# Patient Record
Sex: MALE | URBAN - METROPOLITAN AREA
[De-identification: names, ages, dates, MRNs, and addresses within clinical notes are randomized per-mention and may not be internally consistent; named-entity substitution may affect disease eponyms.]

---

## 2017-05-30 ENCOUNTER — TRANSFERRED RECORDS (OUTPATIENT)
Dept: HEALTH INFORMATION MANAGEMENT | Facility: CLINIC | Age: 22
End: 2017-05-30

## 2017-06-06 ENCOUNTER — TRANSFERRED RECORDS (OUTPATIENT)
Dept: HEALTH INFORMATION MANAGEMENT | Facility: CLINIC | Age: 22
End: 2017-06-06

## 2017-06-22 ENCOUNTER — OFFICE VISIT (OUTPATIENT)
Dept: OTHER | Facility: OUTPATIENT CENTER | Age: 22
End: 2017-06-22

## 2017-06-22 DIAGNOSIS — F91.8 CONDUCT DISORDER, UNDIFFERENTIATED TYPE: Primary | ICD-10-CM

## 2017-06-22 NOTE — MR AVS SNAPSHOT
After Visit Summary   2017    Fran Strong    MRN: [id number]           Patient Information     Date Of Birth          1995        Visit Information        Provider Department      2017 1:00 PM Med Alfonso, PhD LP [facility]        Today's Diagnoses     Conduct disorder, undifferentiated type    -  1       Follow-ups after your visit        Your next 10 appointments already scheduled     2017  9:00 AM CDT   INDIVIDUAL THERAPY with Med Alfonso, PhD DOMO   [facility] ([facility])    [address] [facility] [address]  Mail Code 7521  [facility] [address]   [phone]              Who to contact     Please call your clinic at [phone] to:    Ask questions about your health    Make or cancel appointments    Discuss your medicines    Learn about your test results    Speak to your doctor   If you have compliments or concerns about an experience at your clinic, or if you wish to file a complaint, please contact [facility] Patient Relations at [phone] or email us at [email or web address]         Additional Information About Your Visit        MyChart Information     [employer] is an electronic gateway that provides easy, online access to your medical records. With [employer], you can request a clinic appointment, read your test results, renew a prescription or communicate with your care team.     To sign up for [employer] visit the website at [email or web address]   You will be asked to enter the access code listed below, as well as some personal information. Please follow the directions to create your username and password.     Your access code is: [email or web address]  Expires: 2017  3:26 PM     Your access code will  in 90 days. If you need help or a new code, please contact your [facility] or call [phone] for assistance.        Care EveryWhere ID     This is your  Care EveryWhere ID. This could be used by other organizations to access your [place] medical records  [id number]         Blood Pressure from Last 3 Encounters:   No data found for BP    Weight from Last 3 Encounters:   No data found for Wt              We Performed the Following     Diagnostic Assessment (complete) [88274]        Primary Care Provider    None Specified       No primary provider on file.        Equal Access to Services     [facility] : Hadii aad ku hadasho Soomaali, waaxda luqadaha, qaybta kaalmada adeharshadyada, mart paulino guyn augustusharshad lyonjasper rick . So [facility] [phone].    ATENCIÓN: Si habla español, tiene a rogers disposición servicios gratuitos de asistencia lingüística. Llame al [phone].    We comply with applicable federal civil rights laws and [place] laws. We do not discriminate on the basis of race, color, national origin, age, disability sex, sexual orientation or gender identity.            Thank you!     Thank you for choosing [place] [facility]  for your care. Our goal is always to provide you with excellent care. Hearing back from our patients is one way we can continue to improve our services. Please take a few minutes to complete the written survey that you may receive in the mail after your visit with us. Thank you!             Your Updated Medication List - Protect others around you: Learn how to safely use, store and throw away your medicines at www.disposemymeds.org.          This list is accurate as of: 6/22/17 11:59 PM.  Always use your most recent med list.                   Brand Name Dispense Instructions for use Diagnosis    fluvoxaMINE 100 MG tablet    LUVOX    270 tablet    Take 1 1/2 tablets BID    Impulse control disorder, unspecified       memantine 10 MG tablet    NAMENDA    180 tablet    Take 1 tablet by mouth daily.    Impulse control disorder, unspecified       * methylphenidate 10 MG tablet    RITALIN    30 tablet    Take 1 tablet by mouth daily.     ADHD, predominantly inattentive type       * methylphenidate ER 54 MG CR tablet    CONCERTA    60 tablet    Take 1 tablet by mouth 2 times daily.    ADHD, predominantly inattentive type       * methylphenidate ER 54 MG CR tablet    CONCERTA    60 tablet    Take 1 tablet by mouth 2 times daily.    ADHD, predominantly inattentive type       * methylphenidate ER 54 MG CR tablet    CONCERTA    60 tablet    Take 1 tablet by mouth 2 times daily.    ADHD, predominantly inattentive type       risperiDONE 0.5 MG tablet    risperDAL    315 tablet    take 1 1/2 tablets QAM and 2 tablets QPM    Impulse control disorder, unspecified       * Notice:  This list has 4 medication(s) that are the same as other medications prescribed for you. Read the directions carefully, and ask your doctor or other care provider to review them with you.

## 2017-06-23 ASSESSMENT — ANXIETY QUESTIONNAIRES
6. BECOMING EASILY ANNOYED OR IRRITABLE: SEVERAL DAYS
2. NOT BEING ABLE TO STOP OR CONTROL WORRYING: NOT AT ALL
3. WORRYING TOO MUCH ABOUT DIFFERENT THINGS: NOT AT ALL
GAD7 TOTAL SCORE: 1
7. FEELING AFRAID AS IF SOMETHING AWFUL MIGHT HAPPEN: NOT AT ALL
5. BEING SO RESTLESS THAT IT IS HARD TO SIT STILL: NOT AT ALL
4. TROUBLE RELAXING: NOT AT ALL
1. FEELING NERVOUS, ANXIOUS, OR ON EDGE: NOT AT ALL
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
## 2017-06-24 ASSESSMENT — PATIENT HEALTH QUESTIONNAIRE - PHQ9: SUM OF ALL RESPONSES TO PHQ QUESTIONS 1-9: 0

## 2017-06-24 ASSESSMENT — ANXIETY QUESTIONNAIRES: GAD7 TOTAL SCORE: 1

## 2017-09-12 ENCOUNTER — OFFICE VISIT (OUTPATIENT)
Dept: OTHER | Facility: OUTPATIENT CENTER | Age: 22
End: 2017-09-12

## 2017-09-12 DIAGNOSIS — F91.8 CONDUCT DISORDER, UNDIFFERENTIATED TYPE: Primary | ICD-10-CM

## 2017-09-12 NOTE — MR AVS SNAPSHOT
After Visit Summary   9/12/2017    Fran Strong    MRN: [id number]           Patient Information     Date Of Birth          1995        Visit Information        Provider Department      9/12/2017 3:00 PM Med Alfonso, PhD  [facility]        Today's Diagnoses     Conduct disorder, undifferentiated type    -  1       Follow-ups after your visit        Your next 10 appointments already scheduled     Nov 01, 2017  3:00 PM CDT   INDIVIDUAL THERAPY with Elis Byrd PhD   [facility] ([facility])    [address]  [address]  [facility] [address]   [phone]            Nov 29, 2017  3:00 PM CST   INDIVIDUAL THERAPY with Elis Byrd PhD   [facility] ([facility])    [address]  [address]  [facility] [address]   [phone]              Who to contact     Please call your clinic at [phone] to:    Ask questions about your health    Make or cancel appointments    Discuss your medicines    Learn about your test results    Speak to your doctor   If you have compliments or concerns about an experience at your clinic, or if you wish to file a complaint, please contact [facility] Patient Relations at [phone] or email us at [email or web address]         Additional Information About Your Visit        MyChart Information     [employer] is an electronic gateway that provides easy, online access to your medical records. With [employer], you can request a clinic appointment, read your test results, renew a prescription or communicate with your care team.     To sign up for [employer] visit the website at [email or web address]   You will be asked to enter the access code listed below, as well as some personal information. Please follow the directions to create your username and password.     Your access code is: [email or web address]  Expires: 9/24/2017  3:26 PM      Your access code will  in 90 days. If you need help or a new code, please contact your [facility] or call [phone] for assistance.        Care EveryWhere ID     This is your Care EveryWhere ID. This could be used by other organizations to access your [facility] medical records  [id number]         Blood Pressure from Last 3 Encounters:   No data found for BP    Weight from Last 3 Encounters:   No data found for Wt              We Performed the Following     Individual Psychotherapy (16-37 min) [26413]        Primary Care Provider    None Specified       No primary provider on file.        Equal Access to Services     [facility]: Hadii aad trudy hadasho Soomaali, waaxda luqadaha, qaybta kaalmada ivonne, mart rick . So [facility] [phone].    ATENCIÓN: Si habla español, tiene a rogers disposición servicios gratuitos de asistencia lingüística. [facility] [phone].    We comply with applicable federal civil rights laws and [place] laws. We do not discriminate on the basis of race, color, national origin, age, disability sex, sexual orientation or gender identity.            Thank you!     Thank you for choosing [facility]  for your care. Our goal is always to provide you with excellent care. Hearing back from our patients is one way we can continue to improve our services. Please take a few minutes to complete the written survey that you may receive in the mail after your visit with us. Thank you!             Your Updated Medication List - Protect others around you: Learn how to safely use, store and throw away your medicines at www.disposemymeds.org.          This list is accurate as of: 17  3:36 PM.  Always use your most recent med list.                   Brand Name Dispense Instructions for use Diagnosis    fluvoxaMINE 100 MG tablet    LUVOX    270 tablet    Take 1 1/2 tablets BID    Impulse control disorder, unspecified        memantine 10 MG tablet    NAMENDA    180 tablet    Take 1 tablet by mouth daily.    Impulse control disorder, unspecified       * methylphenidate 10 MG tablet    RITALIN    30 tablet    Take 1 tablet by mouth daily.    ADHD, predominantly inattentive type       * methylphenidate ER 54 MG CR tablet    CONCERTA    60 tablet    Take 1 tablet by mouth 2 times daily.    ADHD, predominantly inattentive type       * methylphenidate ER 54 MG CR tablet    CONCERTA    60 tablet    Take 1 tablet by mouth 2 times daily.    ADHD, predominantly inattentive type       * methylphenidate ER 54 MG CR tablet    CONCERTA    60 tablet    Take 1 tablet by mouth 2 times daily.    ADHD, predominantly inattentive type       risperiDONE 0.5 MG tablet    risperDAL    315 tablet    take 1 1/2 tablets QAM and 2 tablets QPM    Impulse control disorder, unspecified       * Notice:  This list has 4 medication(s) that are the same as other medications prescribed for you. Read the directions carefully, and ask your doctor or other care provider to review them with you.

## 2017-09-12 NOTE — PROGRESS NOTES
[facility] -  Case Progress Note    Date of Service: 9/12/17  Client Name: Fran Strong  YOB: 1995  MRN:  [id number]  Treating Provider: Med Alfonso, PhD LP  Type of Session: Individual  Present in Session: client (his father was present as conjoint)  Number of Minutes:  30    Current Symptoms/Status:  Client has struggled with compulsive-type sexual behavior (CSB), which has interfered with his functioning and caused marked personal distress.      Progress Toward Treatment Goals:   First session since the intake.    Intervention: Modality and Description:  CBT and psychodynamic techniques were used to help explore the client's emotions and sexuality. He missed the last appointment due to a family matter. He did not specify. Fran affirmed that he is still interested in group therapy. He wants to have some individual therapy before starting group. Therapist talked about meeting with Amadou and potentially entering the Tuesday CSB group. Time was spent talking about healthy sexuality and how group helps with that. Fran wants to learn about how to interact with others. His father provided information and support. Time was spent talking his autism spectrum issues. He is glad it is not very noticeable, but he is upfront with others about having it. He has been in groups at [facility]. Fran talked about how he has learned social cues over time; at times he misunderstands social cues, but he feels confident that he can learn. He reported that he is not always aware of how he behavior seems, which is why he relies on his father or others. His father wants to make sure Fran advocates for himself.      Response to Intervention:  Open. Verbal. Took feedback.      Assignment:  Meet with Amadou Byrd    Diagnosis:  312.89 (F91.8) Other Specified Disruptive, Impulse-Control, and Conduct Disorder (sexual acting out)  Autism Spectrum Disorder (not diagnosed today)    Plan / Need for Future  Services:  Return for therapy in 2 weeks.      Med Alfonso, PhD LP

## 2017-11-29 ENCOUNTER — OFFICE VISIT (OUTPATIENT)
Dept: OTHER | Facility: OUTPATIENT CENTER | Age: 22
End: 2017-11-29

## 2017-11-29 DIAGNOSIS — F91.8 CONDUCT DISORDER, UNDIFFERENTIATED TYPE: Primary | ICD-10-CM

## 2017-11-29 DIAGNOSIS — F84.0 AUTISM SPECTRUM DISORDER: ICD-10-CM

## 2017-11-29 NOTE — MR AVS SNAPSHOT
After Visit Summary   2017    Fran Strong    MRN: [id number]           Patient Information     Date Of Birth          1995        Visit Information        Provider Department      2017 3:00 PM Elis Byrd, PhD [facility]        Today's Diagnoses     Conduct disorder, undifferentiated type    -  1    Autism spectrum disorder (by history per client report)           Follow-ups after your visit        Your next 10 appointments already scheduled     2018  1:00 PM CST   INDIVIDUAL THERAPY with Elis Byrd, PhD   [facility] ([facility])    [address]  Mail [address] 75[address]  [facility] [address]   [phone]              Who to contact     Please call your clinic at [phone] to:    Ask questions about your health    Make or cancel appointments    Discuss your medicines    Learn about your test results    Speak to your doctor   If you have compliments or concerns about an experience at your clinic, or if you wish to file a complaint, please contact [facility] Patient Relations at [phone] or email us at [email or web address]         Additional Information About Your Visit        MyChart Information     Pinterest is an electronic gateway that provides easy, online access to your medical records. With Pinterest, you can request a clinic appointment, read your test results, renew a prescription or communicate with your care team.     To sign up for [employer] visit the website at [email or web address]   You will be asked to enter the access code listed below, as well as some personal information. Please follow the directions to create your username and password.     Your access code is: [email or web address]  Expires: 3/11/2018  8:38 AM     Your access code will  in 90 days. If you need help or a new code, please contact your [facility] or call  [phone] for assistance.        Care EveryWhere ID     This is your Care EveryWhere ID. This could be used by other organizations to access your [facility] medical records  [id number]         Blood Pressure from Last 3 Encounters:   No data found for BP    Weight from Last 3 Encounters:   No data found for Wt              We Performed the Following     Individual Psychotherapy (53+ min) [12956]        Primary Care Provider    None Specified       No primary provider on file.        Equal Access to Services     [facility] : Hadii aad ku hadasho Soomaali, waaxda luqadaha, qaybta kaalmada adeegyada, waxmarvin louisain guyn adeharshad torres laernestineisha . So [facility] [phone].    ATENCIÓN: Si habla español, tiene a rogers disposición servicios gratuitos de asistencia lingüística. Llame al [phone].    We comply with applicable federal civil rights laws and [place] laws. We do not discriminate on the basis of race, color, national origin, age, disability, sex, sexual orientation, or gender identity.            Thank you!     Thank you for choosing [facility]  for your care. Our goal is always to provide you with excellent care. Hearing back from our patients is one way we can continue to improve our services. Please take a few minutes to complete the written survey that you may receive in the mail after your visit with us. Thank you!             Your Updated Medication List - Protect others around you: Learn how to safely use, store and throw away your medicines at www.disposemymeds.org.          This list is accurate as of: 11/29/17 11:59 PM.  Always use your most recent med list.                   Brand Name Dispense Instructions for use Diagnosis    fluvoxaMINE 100 MG tablet    LUVOX    270 tablet    Take 1 1/2 tablets BID    Impulse control disorder, unspecified       memantine 10 MG tablet    NAMENDA    180 tablet    Take 1 tablet by mouth daily.    Impulse control disorder, unspecified       *  methylphenidate 10 MG tablet    RITALIN    30 tablet    Take 1 tablet by mouth daily.    ADHD, predominantly inattentive type       * methylphenidate ER 54 MG CR tablet    CONCERTA    60 tablet    Take 1 tablet by mouth 2 times daily.    ADHD, predominantly inattentive type       * methylphenidate ER 54 MG CR tablet    CONCERTA    60 tablet    Take 1 tablet by mouth 2 times daily.    ADHD, predominantly inattentive type       * methylphenidate ER 54 MG CR tablet    CONCERTA    60 tablet    Take 1 tablet by mouth 2 times daily.    ADHD, predominantly inattentive type       risperiDONE 0.5 MG tablet    risperDAL    315 tablet    take 1 1/2 tablets QAM and 2 tablets QPM    Impulse control disorder, unspecified       * Notice:  This list has 4 medication(s) that are the same as other medications prescribed for you. Read the directions carefully, and ask your doctor or other care provider to review them with you.

## 2017-11-30 NOTE — PROGRESS NOTES
"[facility] -  Case Progress Note    Date of Service: 11/29/17  Client Name: Fran Strong  YOB: 1995  MRN:  [id number]  Treating Provider: JUAN FRANCISCO Byrd, PhD, Postdoctoral Fellow  Type of Session: Individual  Present in Session: client (his father was present as conjoint)  Number of Minutes:  55    Current Symptoms/Status:  Client has struggled with compulsive-type sexual behavior (CSB), which has interfered with his functioning and caused marked personal distress.      Progress Toward Treatment Goals:   Attended first session with this therapist    Intervention: Modality and Description:  CBT, interpersonal and supportive psychotherapy techniques were used to assist client with building rapport with new therapist and exploring emotions and sexuality. Client shared that he is interested in working towards attending group therapy for this compulsive sexual behaviors but is not sure that it will be a good fit. Client shared a little about his history including pornography use, masturbation, and hiring prostitutes. 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 Client and his father shared that client has a history of autism spectrum disorder and that client struggles with obsessiveness, social cues, and managing emotions at times. Client shared that he is interested in having a healthy sexuality that can align with his Gnosticism beliefs. Shared about previously participating in therapy but feeling as if he \"bullshitted\" his way through it. Discussed that client is interested in being honest and working authentically in therapy now. Discussed how therapist would know that client is not \"bullshitting\" now. Client stated that he would tell the truth if he is asked.    Met briefly with client and his father separately. Client shared that he wants to have healthy sexual behaviors. Shared that he feels like he can talk openly in front of his father but that there is a " "more conflictual relationship with his mother.    Client father shared that client struggles to regulate his emotions at times. Shared that he will curse and begin to pace if getting upset. Shared that he is not sure group will be a good fit for him. Discussed that client will need to build relationship with this therapist and demonstrate that he is able to regulate his emotions without assistance from his father prior to being able to join group (because father will not be able to attend group).    Response to Intervention:  Open. Verbal. Took feedback.      Assignment:  Meet with Amadou Byrd    Diagnosis:  Encounter Diagnoses   Name Primary?     Conduct disorder, undifferentiated type Yes     Autism spectrum disorder (by history per client report)        Plan / Need for Future Services:  Return for therapy in 2 weeks.      Elis \"Amadou\"Carson, Ph.D.  Postdoctoral Fellow  "

## 2017-12-11 NOTE — PROGRESS NOTES
I did not personally see the patient.  I reviewed and agree with the assessment and plan as documented in this note.     Li Ramsay PsyD, LP